# Patient Record
Sex: FEMALE | Race: WHITE | Employment: PART TIME | URBAN - METROPOLITAN AREA
[De-identification: names, ages, dates, MRNs, and addresses within clinical notes are randomized per-mention and may not be internally consistent; named-entity substitution may affect disease eponyms.]

---

## 2021-10-11 ENCOUNTER — HOSPITAL ENCOUNTER (EMERGENCY)
Age: 31
Discharge: LEFT BEFORE DISCHARGE | End: 2021-10-11
Payer: COMMERCIAL

## 2021-10-11 PROCEDURE — 75810000275 HC EMERGENCY DEPT VISIT NO LEVEL OF CARE

## 2023-07-27 ENCOUNTER — APPOINTMENT (OUTPATIENT)
Facility: HOSPITAL | Age: 33
DRG: 861 | End: 2023-07-27
Payer: MEDICAID

## 2023-07-27 ENCOUNTER — HOSPITAL ENCOUNTER (INPATIENT)
Facility: HOSPITAL | Age: 33
LOS: 2 days | Discharge: HOME | DRG: 861 | End: 2023-07-29
Attending: EMERGENCY MEDICINE | Admitting: STUDENT IN AN ORGANIZED HEALTH CARE EDUCATION/TRAINING PROGRAM
Payer: MEDICAID

## 2023-07-27 DIAGNOSIS — R41.82 ALTERED MENTAL STATUS, UNSPECIFIED ALTERED MENTAL STATUS TYPE: Primary | ICD-10-CM

## 2023-07-27 LAB
ALBUMIN SERPL-MCNC: 3.7 G/DL (ref 3.5–5.2)
ALBUMIN/GLOB SERPL: 1.2 (ref 1.1–2.2)
ALP SERPL-CCNC: 127 U/L (ref 35–104)
ALT SERPL-CCNC: 13 U/L (ref 10–35)
AMPHET UR QL SCN: NEGATIVE
ANION GAP BLD CALC-SCNC: 5 (ref 10–20)
ANION GAP SERPL CALC-SCNC: 11 MMOL/L (ref 5–15)
APAP SERPL-MCNC: <5 UG/ML (ref 10–30)
APPEARANCE UR: CLEAR
AST SERPL-CCNC: 30 U/L (ref 10–35)
BACTERIA URNS QL MICRO: NEGATIVE /HPF
BARBITURATES UR QL SCN: NEGATIVE
BASE DEFICIT BLD-SCNC: 0.1 MMOL/L
BASOPHILS # BLD: 0 K/UL (ref 0–1)
BASOPHILS NFR BLD: 0 % (ref 0–1)
BENZODIAZ UR QL: NEGATIVE
BILIRUB SERPL-MCNC: 0.4 MG/DL (ref 0.2–1)
BILIRUB UR QL: NEGATIVE
BUN SERPL-MCNC: 5 MG/DL (ref 6–20)
BUN/CREAT SERPL: 8 (ref 12–20)
CA-I BLD-MCNC: 1.16 MMOL/L (ref 1.12–1.32)
CALCIUM SERPL-MCNC: 9.1 MG/DL (ref 8.6–10)
CANNABINOIDS UR QL SCN: NEGATIVE
CHLORIDE BLD-SCNC: 117 MMOL/L (ref 100–108)
CHLORIDE SERPL-SCNC: 112 MMOL/L (ref 98–107)
CK SERPL-CCNC: 35 U/L (ref 20–180)
CO2 BLD-SCNC: 25 MMOL/L (ref 19–24)
CO2 SERPL-SCNC: 22 MMOL/L (ref 22–29)
COCAINE UR QL SCN: NEGATIVE
COLOR UR: NORMAL
COMMENT:: NORMAL
CREAT SERPL-MCNC: 0.59 MG/DL (ref 0.5–0.9)
CREAT UR-MCNC: 0.7 MG/DL (ref 0.6–1.3)
DIFFERENTIAL METHOD BLD: ABNORMAL
EKG ATRIAL RATE: 100 BPM
EKG DIAGNOSIS: NORMAL
EKG P AXIS: 28 DEGREES
EKG P-R INTERVAL: 150 MS
EKG Q-T INTERVAL: 344 MS
EKG QRS DURATION: 66 MS
EKG QTC CALCULATION (BAZETT): 443 MS
EKG R AXIS: 61 DEGREES
EKG T AXIS: 19 DEGREES
EKG VENTRICULAR RATE: 100 BPM
EOSINOPHIL # BLD: 0.1 K/UL (ref 0–0.4)
EOSINOPHIL NFR BLD: 2 %
EPITH CASTS URNS QL MICRO: NORMAL /LPF
ERYTHROCYTE [DISTWIDTH] IN BLOOD BY AUTOMATED COUNT: 11.6 % (ref 11.5–14.5)
ETHANOL SERPL-MCNC: <10 MG/DL (ref 0–10)
GLOBULIN SER CALC-MCNC: 3.2 G/DL (ref 2–4)
GLUCOSE BLD STRIP.AUTO-MCNC: 110 MG/DL (ref 65–117)
GLUCOSE BLD STRIP.AUTO-MCNC: 96 MG/DL (ref 74–106)
GLUCOSE SERPL-MCNC: 107 MG/DL (ref 65–100)
GLUCOSE UR STRIP.AUTO-MCNC: NEGATIVE MG/DL
HCG UR QL: NEGATIVE
HCO3 BLDA-SCNC: 25 MMOL/L
HCT VFR BLD AUTO: 41.6 % (ref 35–47)
HGB BLD-MCNC: 14.2 G/DL (ref 11.5–16)
HGB UR QL STRIP: NEGATIVE
IMM GRANULOCYTES # BLD AUTO: 0 K/UL (ref 0–0.04)
IMM GRANULOCYTES NFR BLD AUTO: 0 % (ref 0–0.5)
INR PPP: 1 (ref 0.9–1.1)
KETONES UR QL STRIP.AUTO: NEGATIVE MG/DL
LACTATE BLD-SCNC: 0.6 MMOL/L (ref 0.4–2)
LEUKOCYTE ESTERASE UR QL STRIP.AUTO: NEGATIVE
LYMPHOCYTES # BLD: 1.6 K/UL (ref 0.8–3.5)
LYMPHOCYTES NFR BLD: 26 % (ref 12–49)
Lab: NORMAL
MCH RBC QN AUTO: 33.6 PG (ref 26–34)
MCHC RBC AUTO-ENTMCNC: 34.1 G/DL (ref 30–36.5)
MCV RBC AUTO: 98.3 FL (ref 80–99)
METHADONE UR QL: NEGATIVE
MONOCYTES # BLD: 0.4 K/UL (ref 0–1)
MONOCYTES NFR BLD: 6 % (ref 5–13)
NEUTS SEG # BLD: 4 K/UL (ref 1.8–8)
NEUTS SEG NFR BLD: 66 % (ref 32–75)
NITRITE UR QL STRIP.AUTO: NEGATIVE
NRBC # BLD: 0 K/UL (ref 0–0.01)
NRBC BLD-RTO: 0 PER 100 WBC
OPIATES UR QL: NEGATIVE
PCO2 BLDV: 40.5 MMHG (ref 41–51)
PCP UR QL: NEGATIVE
PH BLDV: 7.4 (ref 7.32–7.42)
PH UR STRIP: 6 (ref 5–8)
PLATELET # BLD AUTO: 289 K/UL (ref 150–400)
PMV BLD AUTO: 9.1 FL (ref 8.9–12.9)
PO2 BLDV: 46 MMHG (ref 25–40)
POTASSIUM BLD-SCNC: 4.6 MMOL/L (ref 3.5–5.5)
POTASSIUM SERPL-SCNC: 4.4 MMOL/L (ref 3.5–5.1)
PROT SERPL-MCNC: 6.9 G/DL (ref 6.4–8.3)
PROT UR STRIP-MCNC: NEGATIVE MG/DL
PROTHROMBIN TIME: 13.5 SEC (ref 11.9–14.6)
RBC # BLD AUTO: 4.23 M/UL (ref 3.8–5.2)
RBC #/AREA URNS HPF: NORMAL /HPF
SALICYLATES SERPL-MCNC: <0.3 MG/DL (ref 3–10)
SAO2 % BLD: 81 %
SERVICE CMNT-IMP: NORMAL
SODIUM BLD-SCNC: 147 MMOL/L (ref 136–145)
SODIUM SERPL-SCNC: 145 MMOL/L (ref 136–145)
SP GR UR REFRACTOMETRY: 1.01 (ref 1–1.03)
SPECIMEN HOLD: NORMAL
SPECIMEN SITE: ABNORMAL
UROBILINOGEN UR QL STRIP.AUTO: 0.2 EU/DL (ref 0.2–1)
WBC # BLD AUTO: 6.2 K/UL (ref 3.6–11)
WBC URNS QL MICRO: NORMAL /HPF (ref 0–4)

## 2023-07-27 PROCEDURE — 81001 URINALYSIS AUTO W/SCOPE: CPT

## 2023-07-27 PROCEDURE — 96374 THER/PROPH/DIAG INJ IV PUSH: CPT

## 2023-07-27 PROCEDURE — 71045 X-RAY EXAM CHEST 1 VIEW: CPT

## 2023-07-27 PROCEDURE — 85610 PROTHROMBIN TIME: CPT

## 2023-07-27 PROCEDURE — 80307 DRUG TEST PRSMV CHEM ANLYZR: CPT

## 2023-07-27 PROCEDURE — 80179 DRUG ASSAY SALICYLATE: CPT

## 2023-07-27 PROCEDURE — 6360000004 HC RX CONTRAST MEDICATION: Performed by: EMERGENCY MEDICINE

## 2023-07-27 PROCEDURE — 2580000003 HC RX 258: Performed by: EMERGENCY MEDICINE

## 2023-07-27 PROCEDURE — 70450 CT HEAD/BRAIN W/O DYE: CPT

## 2023-07-27 PROCEDURE — 80053 COMPREHEN METABOLIC PANEL: CPT

## 2023-07-27 PROCEDURE — 70496 CT ANGIOGRAPHY HEAD: CPT

## 2023-07-27 PROCEDURE — 82803 BLOOD GASES ANY COMBINATION: CPT

## 2023-07-27 PROCEDURE — 82550 ASSAY OF CK (CPK): CPT

## 2023-07-27 PROCEDURE — 6360000002 HC RX W HCPCS: Performed by: EMERGENCY MEDICINE

## 2023-07-27 PROCEDURE — 81025 URINE PREGNANCY TEST: CPT

## 2023-07-27 PROCEDURE — 4A03X5D MEASUREMENT OF ARTERIAL FLOW, INTRACRANIAL, EXTERNAL APPROACH: ICD-10-PCS | Performed by: RADIOLOGY

## 2023-07-27 PROCEDURE — 84295 ASSAY OF SERUM SODIUM: CPT

## 2023-07-27 PROCEDURE — 82962 GLUCOSE BLOOD TEST: CPT

## 2023-07-27 PROCEDURE — 99285 EMERGENCY DEPT VISIT HI MDM: CPT

## 2023-07-27 PROCEDURE — 84132 ASSAY OF SERUM POTASSIUM: CPT

## 2023-07-27 PROCEDURE — 0042T CT BRAIN PERFUSION: CPT

## 2023-07-27 PROCEDURE — 80143 DRUG ASSAY ACETAMINOPHEN: CPT

## 2023-07-27 PROCEDURE — 93005 ELECTROCARDIOGRAM TRACING: CPT | Performed by: INTERNAL MEDICINE

## 2023-07-27 PROCEDURE — 85025 COMPLETE CBC W/AUTO DIFF WBC: CPT

## 2023-07-27 PROCEDURE — 82947 ASSAY GLUCOSE BLOOD QUANT: CPT

## 2023-07-27 PROCEDURE — 82077 ASSAY SPEC XCP UR&BREATH IA: CPT

## 2023-07-27 PROCEDURE — 93005 ELECTROCARDIOGRAM TRACING: CPT | Performed by: EMERGENCY MEDICINE

## 2023-07-27 PROCEDURE — 36415 COLL VENOUS BLD VENIPUNCTURE: CPT

## 2023-07-27 PROCEDURE — 1100000000 HC RM PRIVATE

## 2023-07-27 PROCEDURE — 93010 ELECTROCARDIOGRAM REPORT: CPT | Performed by: SPECIALIST

## 2023-07-27 PROCEDURE — 82330 ASSAY OF CALCIUM: CPT

## 2023-07-27 RX ORDER — SODIUM CHLORIDE, SODIUM LACTATE, POTASSIUM CHLORIDE, AND CALCIUM CHLORIDE .6; .31; .03; .02 G/100ML; G/100ML; G/100ML; G/100ML
1000 INJECTION, SOLUTION INTRAVENOUS ONCE
Status: COMPLETED | OUTPATIENT
Start: 2023-07-27 | End: 2023-07-27

## 2023-07-27 RX ORDER — MIDAZOLAM HYDROCHLORIDE 2 MG/2ML
0.5 INJECTION, SOLUTION INTRAMUSCULAR; INTRAVENOUS EVERY 10 MIN PRN
Status: DISCONTINUED | OUTPATIENT
Start: 2023-07-27 | End: 2023-07-28

## 2023-07-27 RX ADMIN — MIDAZOLAM 0.5 MG: 1 INJECTION INTRAMUSCULAR; INTRAVENOUS at 17:28

## 2023-07-27 RX ADMIN — IOPAMIDOL 100 ML: 755 INJECTION, SOLUTION INTRAVENOUS at 17:52

## 2023-07-27 RX ADMIN — MIDAZOLAM 0.5 MG: 1 INJECTION INTRAMUSCULAR; INTRAVENOUS at 17:21

## 2023-07-27 RX ADMIN — SODIUM CHLORIDE, POTASSIUM CHLORIDE, SODIUM LACTATE AND CALCIUM CHLORIDE 1000 ML: 600; 310; 30; 20 INJECTION, SOLUTION INTRAVENOUS at 16:40

## 2023-07-27 ASSESSMENT — PAIN - FUNCTIONAL ASSESSMENT
PAIN_FUNCTIONAL_ASSESSMENT: NONE - DENIES PAIN
PAIN_FUNCTIONAL_ASSESSMENT: NONE - DENIES PAIN

## 2023-07-27 NOTE — ED TRIAGE NOTES
Patient to ED per EMS was at work and feeling \"lethargic\" and went to sit in car with [facility] running. Wasn't improving and called 911. Patient having involuntary movements of torso and upper limbs. States is nauseas but having no vomiting.  Dr. Ashwin Govea notified and Code Stroke called

## 2023-07-28 ENCOUNTER — APPOINTMENT (OUTPATIENT)
Facility: HOSPITAL | Age: 33
DRG: 861 | End: 2023-07-28
Payer: MEDICAID

## 2023-07-28 LAB
ALBUMIN SERPL-MCNC: 2.7 G/DL (ref 3.5–5)
ALBUMIN/GLOB SERPL: 0.8 (ref 1.1–2.2)
ALP SERPL-CCNC: 89 U/L (ref 45–117)
ALT SERPL-CCNC: 16 U/L (ref 12–78)
ANION GAP SERPL CALC-SCNC: 6 MMOL/L (ref 5–15)
AST SERPL-CCNC: 24 U/L (ref 15–37)
BASOPHILS # BLD: 0 K/UL (ref 0–0.1)
BASOPHILS NFR BLD: 0 % (ref 0–1)
BILIRUB SERPL-MCNC: 0.5 MG/DL (ref 0.2–1)
BUN SERPL-MCNC: 4 MG/DL (ref 6–20)
BUN/CREAT SERPL: 9 (ref 12–20)
CALCIUM SERPL-MCNC: 8.6 MG/DL (ref 8.5–10.1)
CHLORIDE SERPL-SCNC: 117 MMOL/L (ref 97–108)
CO2 SERPL-SCNC: 24 MMOL/L (ref 21–32)
CREAT SERPL-MCNC: 0.47 MG/DL (ref 0.55–1.02)
DIFFERENTIAL METHOD BLD: NORMAL
EOSINOPHIL # BLD: 0 K/UL (ref 0–0.4)
EOSINOPHIL NFR BLD: 0 % (ref 0–7)
ERYTHROCYTE [DISTWIDTH] IN BLOOD BY AUTOMATED COUNT: 11.5 % (ref 11.5–14.5)
GLOBULIN SER CALC-MCNC: 3.2 G/DL (ref 2–4)
GLUCOSE SERPL-MCNC: 111 MG/DL (ref 65–100)
HCT VFR BLD AUTO: 37.6 % (ref 35–47)
HGB BLD-MCNC: 12.9 G/DL (ref 11.5–16)
IMM GRANULOCYTES # BLD AUTO: 0 K/UL (ref 0–0.04)
IMM GRANULOCYTES NFR BLD AUTO: 0 % (ref 0–0.5)
LYMPHOCYTES # BLD: 1.6 K/UL (ref 0.8–3.5)
LYMPHOCYTES NFR BLD: 23 % (ref 12–49)
MAGNESIUM SERPL-MCNC: 2.1 MG/DL (ref 1.6–2.4)
MCH RBC QN AUTO: 33.6 PG (ref 26–34)
MCHC RBC AUTO-ENTMCNC: 34.3 G/DL (ref 30–36.5)
MCV RBC AUTO: 97.9 FL (ref 80–99)
MONOCYTES # BLD: 0.4 K/UL (ref 0–1)
MONOCYTES NFR BLD: 5 % (ref 5–13)
NEUTS SEG # BLD: 4.8 K/UL (ref 1.8–8)
NEUTS SEG NFR BLD: 72 % (ref 32–75)
NRBC # BLD: 0 K/UL (ref 0–0.01)
NRBC BLD-RTO: 0 PER 100 WBC
PHOSPHATE SERPL-MCNC: 2.6 MG/DL (ref 2.6–4.7)
PLATELET # BLD AUTO: 235 K/UL (ref 150–400)
PMV BLD AUTO: 9.5 FL (ref 8.9–12.9)
POTASSIUM SERPL-SCNC: 3.5 MMOL/L (ref 3.5–5.1)
PROT SERPL-MCNC: 5.9 G/DL (ref 6.4–8.2)
RBC # BLD AUTO: 3.84 M/UL (ref 3.8–5.2)
SODIUM SERPL-SCNC: 147 MMOL/L (ref 136–145)
TROPONIN I SERPL HS-MCNC: <4 NG/L (ref 0–51)
WBC # BLD AUTO: 6.8 K/UL (ref 3.6–11)

## 2023-07-28 PROCEDURE — 83735 ASSAY OF MAGNESIUM: CPT

## 2023-07-28 PROCEDURE — 80053 COMPREHEN METABOLIC PANEL: CPT

## 2023-07-28 PROCEDURE — 84484 ASSAY OF TROPONIN QUANT: CPT

## 2023-07-28 PROCEDURE — 92610 EVALUATE SWALLOWING FUNCTION: CPT

## 2023-07-28 PROCEDURE — 84100 ASSAY OF PHOSPHORUS: CPT

## 2023-07-28 PROCEDURE — 6370000000 HC RX 637 (ALT 250 FOR IP): Performed by: INTERNAL MEDICINE

## 2023-07-28 PROCEDURE — 97165 OT EVAL LOW COMPLEX 30 MIN: CPT

## 2023-07-28 PROCEDURE — 36415 COLL VENOUS BLD VENIPUNCTURE: CPT

## 2023-07-28 PROCEDURE — 1100000000 HC RM PRIVATE

## 2023-07-28 PROCEDURE — 97161 PT EVAL LOW COMPLEX 20 MIN: CPT

## 2023-07-28 PROCEDURE — 99223 1ST HOSP IP/OBS HIGH 75: CPT | Performed by: NURSE PRACTITIONER

## 2023-07-28 PROCEDURE — 6360000002 HC RX W HCPCS: Performed by: STUDENT IN AN ORGANIZED HEALTH CARE EDUCATION/TRAINING PROGRAM

## 2023-07-28 PROCEDURE — 2580000003 HC RX 258: Performed by: STUDENT IN AN ORGANIZED HEALTH CARE EDUCATION/TRAINING PROGRAM

## 2023-07-28 PROCEDURE — 97530 THERAPEUTIC ACTIVITIES: CPT

## 2023-07-28 PROCEDURE — 97116 GAIT TRAINING THERAPY: CPT

## 2023-07-28 PROCEDURE — 85025 COMPLETE CBC W/AUTO DIFF WBC: CPT

## 2023-07-28 PROCEDURE — 93005 ELECTROCARDIOGRAM TRACING: CPT | Performed by: INTERNAL MEDICINE

## 2023-07-28 RX ORDER — SODIUM CHLORIDE 9 MG/ML
INJECTION, SOLUTION INTRAVENOUS PRN
Status: DISCONTINUED | OUTPATIENT
Start: 2023-07-28 | End: 2023-07-29 | Stop reason: HOSPADM

## 2023-07-28 RX ORDER — POLYETHYLENE GLYCOL 3350 17 G/17G
17 POWDER, FOR SOLUTION ORAL DAILY PRN
Status: DISCONTINUED | OUTPATIENT
Start: 2023-07-28 | End: 2023-07-29 | Stop reason: HOSPADM

## 2023-07-28 RX ORDER — POTASSIUM CHLORIDE 750 MG/1
40 TABLET, FILM COATED, EXTENDED RELEASE ORAL ONCE
Status: COMPLETED | OUTPATIENT
Start: 2023-07-28 | End: 2023-07-28

## 2023-07-28 RX ORDER — POLYETHYLENE GLYCOL 3350 17 G/17G
17 POWDER, FOR SOLUTION ORAL DAILY PRN
Status: DISCONTINUED | OUTPATIENT
Start: 2023-07-28 | End: 2023-07-28

## 2023-07-28 RX ORDER — ACETAMINOPHEN 650 MG/1
650 SUPPOSITORY RECTAL EVERY 6 HOURS PRN
Status: DISCONTINUED | OUTPATIENT
Start: 2023-07-28 | End: 2023-07-29 | Stop reason: HOSPADM

## 2023-07-28 RX ORDER — ENOXAPARIN SODIUM 100 MG/ML
40 INJECTION SUBCUTANEOUS DAILY
Status: DISCONTINUED | OUTPATIENT
Start: 2023-07-28 | End: 2023-07-29 | Stop reason: HOSPADM

## 2023-07-28 RX ORDER — ATORVASTATIN CALCIUM 20 MG/1
80 TABLET, FILM COATED ORAL NIGHTLY
Status: DISCONTINUED | OUTPATIENT
Start: 2023-07-28 | End: 2023-07-28

## 2023-07-28 RX ORDER — SODIUM CHLORIDE 0.9 % (FLUSH) 0.9 %
5-40 SYRINGE (ML) INJECTION PRN
Status: DISCONTINUED | OUTPATIENT
Start: 2023-07-28 | End: 2023-07-29 | Stop reason: HOSPADM

## 2023-07-28 RX ORDER — ASPIRIN 81 MG/1
81 TABLET, CHEWABLE ORAL DAILY
Status: DISCONTINUED | OUTPATIENT
Start: 2023-07-29 | End: 2023-07-28

## 2023-07-28 RX ORDER — ACETAMINOPHEN 325 MG/1
650 TABLET ORAL EVERY 6 HOURS PRN
Status: DISCONTINUED | OUTPATIENT
Start: 2023-07-28 | End: 2023-07-29 | Stop reason: HOSPADM

## 2023-07-28 RX ORDER — ONDANSETRON 4 MG/1
4 TABLET, ORALLY DISINTEGRATING ORAL EVERY 8 HOURS PRN
Status: DISCONTINUED | OUTPATIENT
Start: 2023-07-28 | End: 2023-07-28

## 2023-07-28 RX ORDER — ONDANSETRON 4 MG/1
4 TABLET, ORALLY DISINTEGRATING ORAL EVERY 8 HOURS PRN
Status: DISCONTINUED | OUTPATIENT
Start: 2023-07-28 | End: 2023-07-29 | Stop reason: HOSPADM

## 2023-07-28 RX ORDER — SODIUM CHLORIDE 0.9 % (FLUSH) 0.9 %
5-40 SYRINGE (ML) INJECTION EVERY 12 HOURS SCHEDULED
Status: DISCONTINUED | OUTPATIENT
Start: 2023-07-28 | End: 2023-07-29 | Stop reason: HOSPADM

## 2023-07-28 RX ORDER — ONDANSETRON 2 MG/ML
4 INJECTION INTRAMUSCULAR; INTRAVENOUS EVERY 6 HOURS PRN
Status: DISCONTINUED | OUTPATIENT
Start: 2023-07-28 | End: 2023-07-28

## 2023-07-28 RX ORDER — ASPIRIN 300 MG/1
300 SUPPOSITORY RECTAL DAILY
Status: DISCONTINUED | OUTPATIENT
Start: 2023-07-29 | End: 2023-07-28

## 2023-07-28 RX ORDER — ONDANSETRON 2 MG/ML
4 INJECTION INTRAMUSCULAR; INTRAVENOUS EVERY 6 HOURS PRN
Status: DISCONTINUED | OUTPATIENT
Start: 2023-07-28 | End: 2023-07-29 | Stop reason: HOSPADM

## 2023-07-28 RX ADMIN — SODIUM CHLORIDE, PRESERVATIVE FREE 10 ML: 5 INJECTION INTRAVENOUS at 22:21

## 2023-07-28 RX ADMIN — SODIUM CHLORIDE, PRESERVATIVE FREE 10 ML: 5 INJECTION INTRAVENOUS at 12:04

## 2023-07-28 RX ADMIN — SODIUM CHLORIDE, PRESERVATIVE FREE 10 ML: 5 INJECTION INTRAVENOUS at 22:22

## 2023-07-28 RX ADMIN — POTASSIUM CHLORIDE 40 MEQ: 750 TABLET, FILM COATED, EXTENDED RELEASE ORAL at 19:25

## 2023-07-28 RX ADMIN — ENOXAPARIN SODIUM 40 MG: 100 INJECTION SUBCUTANEOUS at 12:04

## 2023-07-28 NOTE — CARE COORDINATION
7/28/2023  Case Management Initial Evaluation    4:00 PM  Patient lives with her mother in a 2 story home. She is independent with ADLs at baseline, drives, works. She does not use any DME and no history of HH. Her mother Pernell Orellana is her emergency contact and will drive her home at discharge. She confirms she sees a family doctor//PCP but can't remember the name or location. Chart indicates she has seen an in network doctor in the past. She gets prescriptions from [facility]. No anticipated needs for discharge, patient is anxious to return home.      07/28/23 3955   Service Assessment   Patient Orientation Alert and Oriented   Cognition Alert   History Provided By Patient   Primary [address] Parent; Family Members   Patient's [address] is: Legal Next of [address] [place] [address]   PCP Verified by CM Yes  (Patient confirms she has a PCP but cannot remember name or location of PCP or practice)   Last Visit to PCP Within last year   Prior Functional Level Independent in ADLs/IADLs   Current Functional Level Independent in ADLs/IADLs   Can patient return to prior living arrangement Yes   Ability to make needs known: Fair   Family able to assist with home care needs: Yes   Would you like for me to discuss the discharge plan with any other family members/significant others, and if so, who? Yes  (as needed)   Financial Resources Medicaid   Social/Functional History   Lives With Family   Type of [address] [place] [address] Dr Kumar level   [address]   Transfer Assistance Independent   Active  Yes   Mode of Transportation Car   Occupation Full time employment   Discharge Planning   Type of [address] Family Members;Parent   Current Services Prior To Admission None   Potential Assistance Needed N/A   DME Ordered?  No   Potential Assistance Purchasing

## 2023-07-28 NOTE — CARE COORDINATION
7/28/2023  Case Management Note    3:32 PM  Attempted to see patient for initial evaluation, she was just entering the bathroom and asked for me to come back. Will retry.      DESIRAE Nava

## 2023-07-28 NOTE — PROGRESS NOTES
MRI attempted on the open scanner, patient refused to attempt the test. [facility] 7/28/23 6:27PM

## 2023-07-28 NOTE — ED NOTES
Report called to [facility] 5th floor, Sim Goltz, RN. [address] here for patient transport.      Dawn Orta RN  07/28/23 8652

## 2023-07-28 NOTE — PROGRESS NOTES
Hospitalist Progress Note      NAME:  Bernabe Pimentel   :  1990  MRM:  [id number]    Date/Time: 2023  6:49 PM           Assessment / Plan:     27 yo F with hx depression on Lexapro she reports who presents to Ed with AMS and strange movements and is admitted for further evaluation    Involuntary movements of extremities  Stoccato like speech disturbance  Dilated pupils  Hx opiate abuse  - pt on ms tele. Plan to send for mri and eeg.  - Pt REFUSED mri multiple times at this point  - PENDING EEG  - ADMITTED to taking 8 pills of Lexapro in multiple handfuls today, then immediately denied it again all in front of her mother as a witness. - If ssri overdose, I have seen no prolonged qtc or evident seizure on my exam. There are no signs of serotonin syndrome  - patients given history and exam is very disorganized. Qtc when assessed and reflexes are presently beingn. Pupils still dilated. The admission of overdose of ssri is plausible still. Consult psych. Hx opiate abuse - avoidopiates. Uds neg    I have personally reviewed the radiographs, laboratory data in Epic and decisions and statements above are based partially on this personal interpretation.                  Care Plan discussed with: Patient, Family, and >50% of time spent in counseling and coordination of care    Discussed:  Care Plan    Prophylaxis:  SCD's    Disposition:  Home w/Family v inpt psych           ___________________________________________________    Attending Physician: Smita Aguillon DO        Subjective:     Chief Complaint:      Pt heard new that her sister was giving birth  She then was anxious because all her own memories of having to give up her child came flooding back  She ran to car and then doesn't seem to give reliable history from there  Ued to be on lexapro but not any more  Admitted to taking 8 lexapro  Then says she didn't  Very inconsistent historian  Stoccato like speech that improves when angry    ROS:  (bold

## 2023-07-29 VITALS
SYSTOLIC BLOOD PRESSURE: 120 MMHG | TEMPERATURE: 98.7 F | DIASTOLIC BLOOD PRESSURE: 69 MMHG | OXYGEN SATURATION: 99 % | BODY MASS INDEX: 25.76 KG/M2 | HEIGHT: 62 IN | HEART RATE: 78 BPM | RESPIRATION RATE: 18 BRPM | WEIGHT: 140 LBS

## 2023-07-29 LAB
CHOLEST SERPL-MCNC: 133 MG/DL
ERYTHROCYTE [DISTWIDTH] IN BLOOD BY AUTOMATED COUNT: 11.4 % (ref 11.5–14.5)
EST. AVERAGE GLUCOSE BLD GHB EST-MCNC: 97 MG/DL
FOLATE SERPL-MCNC: 19.6 NG/ML (ref 5–21)
HBA1C MFR BLD: 5 % (ref 4–5.6)
HCT VFR BLD AUTO: 37.8 % (ref 35–47)
HDLC SERPL-MCNC: 53 MG/DL
HDLC SERPL: 2.5 (ref 0–5)
HGB BLD-MCNC: 13.1 G/DL (ref 11.5–16)
LDLC SERPL CALC-MCNC: 56.4 MG/DL (ref 0–100)
MCH RBC QN AUTO: 33.5 PG (ref 26–34)
MCHC RBC AUTO-ENTMCNC: 34.7 G/DL (ref 30–36.5)
MCV RBC AUTO: 96.7 FL (ref 80–99)
NRBC # BLD: 0 K/UL (ref 0–0.01)
NRBC BLD-RTO: 0 PER 100 WBC
PLATELET # BLD AUTO: 260 K/UL (ref 150–400)
PMV BLD AUTO: 9.6 FL (ref 8.9–12.9)
RBC # BLD AUTO: 3.91 M/UL (ref 3.8–5.2)
RPR SER QL: NONREACTIVE
TRIGL SERPL-MCNC: 118 MG/DL
TSH SERPL DL<=0.05 MIU/L-ACNC: 0.24 UIU/ML (ref 0.36–3.74)
VIT B12 SERPL-MCNC: 632 PG/ML (ref 193–986)
VLDLC SERPL CALC-MCNC: 23.6 MG/DL
WBC # BLD AUTO: 8.5 K/UL (ref 3.6–11)

## 2023-07-29 PROCEDURE — 82746 ASSAY OF FOLIC ACID SERUM: CPT

## 2023-07-29 PROCEDURE — 86592 SYPHILIS TEST NON-TREP QUAL: CPT

## 2023-07-29 PROCEDURE — 82607 VITAMIN B-12: CPT

## 2023-07-29 PROCEDURE — 36415 COLL VENOUS BLD VENIPUNCTURE: CPT

## 2023-07-29 PROCEDURE — 80061 LIPID PANEL: CPT

## 2023-07-29 PROCEDURE — 84443 ASSAY THYROID STIM HORMONE: CPT

## 2023-07-29 PROCEDURE — 85027 COMPLETE CBC AUTOMATED: CPT

## 2023-07-29 PROCEDURE — 83036 HEMOGLOBIN GLYCOSYLATED A1C: CPT

## 2023-07-29 PROCEDURE — 6360000002 HC RX W HCPCS: Performed by: STUDENT IN AN ORGANIZED HEALTH CARE EDUCATION/TRAINING PROGRAM

## 2023-07-29 RX ADMIN — ENOXAPARIN SODIUM 40 MG: 100 INJECTION SUBCUTANEOUS at 09:36

## 2023-07-29 NOTE — DISCHARGE INSTRUCTIONS
HOSPITALIST DISCHARGE INSTRUCTIONS  NAME:  Guillermina Del Real   :  1990   MRN:  [id number]     Date/Time:  2023 11:03 AM    ADMIT DATE: 2023     DISCHARGE DATE: 2023     DISCHARGE DIAGNOSIS:    Presumed Toxidrome following emotional event    DISCHARGE INSTRUCTIONS:  Thank you for allowing us to participate in your care. Your discharging Hospitalist is Rusty Contreras DO. You were admitted for evaluation and treatment of the above. You have elected to leave the hospital prior to MRI brain getting done or EEG doing read. We advised that you remain for these but you have refused. We ask you to not take any medications to excess and that you call the psychiatrist for follow up and consideration of working on coping mechanisms    MEDICATIONS:    It is important that you take the medication exactly as they are prescribed. Keep your medication in the bottles provided by the pharmacist and keep a list of the medication names, dosages, and times to be taken in your wallet. Do not take other medications without consulting your doctor. If you experience any of the following symptoms then please call your primary care physician or return to the emergency room if you cannot get hold of your doctor:  Fever, chills, nausea, vomiting, diarrhea, change in mentation, falling, bleeding, shortness of breath    Follow Up:  Please call the below provider to arrange hospital follow up appointment      None None    Follow up  1-2 weeks with primary care    [address] [place] [address]  [phone]  Follow up  1-2 weeks      For questions regarding your Hospitalization or to contact the [address] [place] [address] team, please call [phone]. Information obtained by :  I understand that if any problems occur once I am at home I am to contact my physician. I understand and acknowledge receipt of the instructions indicated above.

## 2023-07-29 NOTE — PROGRESS NOTES
Pt declined Brain MRI (See the imaging order section) and the EEG ordered yesterday was not performed. I will sign off as I have no data to review and patient is being discharged.

## 2023-07-29 NOTE — PROGRESS NOTES
Pt discharge home with her mother. DC instructions given. Pt verbalized understanding. No distress noted. IV discontinue. Telemetry was remove by patient earlier.

## 2023-07-29 NOTE — DISCHARGE SUMMARY
Hospitalist Discharge Summary     Patient ID:  Jacquie Corado  [id number]  89 y.o.  1990    Admit date: 7/27/2023    Discharge date and time: 7/29/2023    Admission Diagnoses: Altered mental status [R41.82]  Altered mental status, unspecified altered mental status type [R41.82]    Discharge Diagnoses:    Principal Problem:    Altered mental status  Resolved Problems:    * No resolved hospital problems. *         Hospital Course:   25 yo F with pmhx substance who rpesents to ED with CC abnormal movements of extremtiies at work and dilated pupils. Tox screen neg. Initially pt reported taking too many ssris but then later denied saying it. Tried to get MRI but patient refused. Got EEG, results pending. Pt requested discharge denying further testing. Her symptoms have resolved at this point. Updated mother as she has been present throughout case. Pt denies this was suicide attempt and mother also has no concerns for suicide. Advised pt to follow up to psych to work on coping mechanisms. Do not supsect seizure. Suspect psych related. Nott unreasonable to dc with outpatient f/u to psych. Physical exam no benign and back to normal.    Imaging  CT HEAD WO CONTRAST    Result Date: 7/27/2023  No acute abnormality     XR CHEST PORTABLE    Result Date: 7/27/2023  No acute cardiopulmonary process. CT Brain Perfusion    Result Date: 7/27/2023  1. No evidence for acute large vessel arterial occlusion or significant cerebral perfusion abnormality. CTA HEAD NECK W CONTRAST    Result Date: 7/27/2023  1. No evidence for acute large vessel arterial occlusion or significant cerebral perfusion abnormality.         PCP: None None     Consults: Neuro    Condition of patient at discharge: Improved    Discharge Exam:    Physical Exam:    Gen: Well-developed, well-nourished, in no acute distress  HEENT:  Pink conjunctivae, PERRL, hearing intact to voice, moist mucous membranes  Neck: Supple, without masses, thyroid

## 2023-07-30 LAB
EKG ATRIAL RATE: 104 BPM
EKG ATRIAL RATE: 83 BPM
EKG DIAGNOSIS: NORMAL
EKG DIAGNOSIS: NORMAL
EKG P AXIS: 32 DEGREES
EKG P AXIS: 50 DEGREES
EKG P-R INTERVAL: 152 MS
EKG P-R INTERVAL: 188 MS
EKG Q-T INTERVAL: 362 MS
EKG Q-T INTERVAL: 386 MS
EKG QRS DURATION: 74 MS
EKG QRS DURATION: 76 MS
EKG QTC CALCULATION (BAZETT): 453 MS
EKG QTC CALCULATION (BAZETT): 476 MS
EKG R AXIS: 60 DEGREES
EKG R AXIS: 95 DEGREES
EKG T AXIS: -12 DEGREES
EKG T AXIS: -22 DEGREES
EKG VENTRICULAR RATE: 104 BPM
EKG VENTRICULAR RATE: 83 BPM

## 2023-07-30 PROCEDURE — 93010 ELECTROCARDIOGRAM REPORT: CPT | Performed by: SPECIALIST

## 2023-10-17 ENCOUNTER — APPOINTMENT (OUTPATIENT)
Facility: HOSPITAL | Age: 33
End: 2023-10-17
Payer: COMMERCIAL

## 2023-10-17 ENCOUNTER — HOSPITAL ENCOUNTER (EMERGENCY)
Facility: HOSPITAL | Age: 33
Discharge: HOME | End: 2023-10-17
Attending: FAMILY MEDICINE
Payer: COMMERCIAL

## 2023-10-17 VITALS
SYSTOLIC BLOOD PRESSURE: 117 MMHG | TEMPERATURE: 98 F | BODY MASS INDEX: 27.23 KG/M2 | WEIGHT: 148 LBS | DIASTOLIC BLOOD PRESSURE: 74 MMHG | HEART RATE: 106 BPM | RESPIRATION RATE: 18 BRPM | HEIGHT: 62 IN | OXYGEN SATURATION: 94 %

## 2023-10-17 DIAGNOSIS — S00.83XA TRAUMATIC HEMATOMA OF FOREHEAD, INITIAL ENCOUNTER: ICD-10-CM

## 2023-10-17 DIAGNOSIS — S42.035A CLOSED NONDISPLACED FRACTURE OF ACROMIAL END OF LEFT CLAVICLE, INITIAL ENCOUNTER: Primary | ICD-10-CM

## 2023-10-17 PROCEDURE — 70486 CT MAXILLOFACIAL W/O DYE: CPT

## 2023-10-17 PROCEDURE — 99284 EMERGENCY DEPT VISIT MOD MDM: CPT

## 2023-10-17 PROCEDURE — 71045 X-RAY EXAM CHEST 1 VIEW: CPT

## 2023-10-17 PROCEDURE — 6370000000 HC RX 637 (ALT 250 FOR IP): Performed by: STUDENT IN AN ORGANIZED HEALTH CARE EDUCATION/TRAINING PROGRAM

## 2023-10-17 PROCEDURE — 70450 CT HEAD/BRAIN W/O DYE: CPT

## 2023-10-17 PROCEDURE — 72125 CT NECK SPINE W/O DYE: CPT

## 2023-10-17 PROCEDURE — 73030 X-RAY EXAM OF SHOULDER: CPT

## 2023-10-17 RX ORDER — ACETAMINOPHEN 500 MG
1000 TABLET ORAL
Status: COMPLETED | OUTPATIENT
Start: 2023-10-17 | End: 2023-10-17

## 2023-10-17 RX ADMIN — ACETAMINOPHEN 1000 MG: 500 TABLET ORAL at 19:27

## 2023-10-17 ASSESSMENT — PAIN SCALES - GENERAL: PAINLEVEL_OUTOF10: 3

## 2023-10-17 ASSESSMENT — PAIN - FUNCTIONAL ASSESSMENT: PAIN_FUNCTIONAL_ASSESSMENT: 0-10
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
## 2023-10-18 NOTE — DISCHARGE INSTRUCTIONS
Thank you! Thank you for allowing me to care for you in the emergency department. It is my goal to provide you with excellent care. If you have not received excellent quality care, please ask to speak to the nurse manager. Please fill out the survey that will come to you by mail or email since we listen to your feedback! Below you will find a list of your tests from today's visit. Should you have any questions, please do not hesitate to call the emergency department. Labs  No results found for this or any previous visit (from the past 12 hour(s)). Radiologic Studies  CT HEAD WO CONTRAST   Final Result   No acute intracranial process is identified. Please see above for additional nonemergent incidental findings. There is no acute fracture or dislocation identified. Soft tissue edema overlies the left orbit and left frontal bone. .      CT MAXILLOFACIAL WO CONTRAST   Final Result   No acute intracranial process is identified. Please see above for additional nonemergent incidental findings. There is no acute fracture or dislocation identified. Soft tissue edema overlies the left orbit and left frontal bone. .      CT CERVICAL SPINE WO CONTRAST   Final Result   No acute fracture or subluxation. XR CHEST PORTABLE   Final Result      Nondisplaced fracture of the distal aspect of the left clavicle. Lungs are   clear. XR SHOULDER LEFT (MIN 2 VIEWS)   Final Result   Nondisplaced fracture of the distal left clavicle.        ------------------------------------------------------------------------------------------------------------  The exam and treatment you received in the Emergency Department were for an urgent problem and are not intended as complete care. It is important that you follow-up with a doctor, nurse practitioner, or physician assistant to:  (1) confirm your diagnosis,  (2) re-evaluation of changes in your illness and treatment, and  (3) for ongoing care.  Please take
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
## 2023-11-03 ENCOUNTER — HOSPITAL ENCOUNTER (EMERGENCY)
Facility: HOSPITAL | Age: 33
Discharge: HOME | End: 2023-11-03
Attending: STUDENT IN AN ORGANIZED HEALTH CARE EDUCATION/TRAINING PROGRAM
Payer: COMMERCIAL

## 2023-11-03 VITALS
HEIGHT: 63 IN | OXYGEN SATURATION: 99 % | HEART RATE: 99 BPM | WEIGHT: 137 LBS | DIASTOLIC BLOOD PRESSURE: 81 MMHG | BODY MASS INDEX: 24.27 KG/M2 | RESPIRATION RATE: 17 BRPM | TEMPERATURE: 98.6 F | SYSTOLIC BLOOD PRESSURE: 120 MMHG

## 2023-11-03 DIAGNOSIS — F10.90 ALCOHOL USE DISORDER: Primary | ICD-10-CM

## 2023-11-03 PROCEDURE — 99282 EMERGENCY DEPT VISIT SF MDM: CPT

## 2023-11-03 ASSESSMENT — PAIN SCALES - GENERAL: PAINLEVEL_OUTOF10: 3

## 2023-11-03 ASSESSMENT — LIFESTYLE VARIABLES
HOW MANY STANDARD DRINKS CONTAINING ALCOHOL DO YOU HAVE ON A TYPICAL DAY: 10 OR MORE
HOW OFTEN DO YOU HAVE A DRINK CONTAINING ALCOHOL: 4 OR MORE TIMES A WEEK

## 2023-11-03 ASSESSMENT — PAIN DESCRIPTION - LOCATION: LOCATION: GENERALIZED

## 2023-11-03 NOTE — ED TRIAGE NOTES
Patient was discharged from a mental health facility today due to insurance issues and was unable to stay the full 30 days. Family member with patient states they called crisis line & were told to come to ED. Patient denies SI, but mother said that is not what patient has been saying.

## 2023-11-03 NOTE — ED PROVIDER NOTES
[facility] EMERGENCY DEPT  EMERGENCY DEPARTMENT HISTORY AND PHYSICAL EXAM      Date: 11/3/2023  Patient Name: Wallace Kuo  MRN: [id number]  [address] 1990  Date of evaluation: 11/3/2023  Provider: Donovan Barth MD   Note Started: 7:54 PM EDT 11/3/23    HISTORY OF PRESENT ILLNESS     Chief Complaint   Patient presents with    Mental Health Problem       History Provided By: Patient    HPI: Wallace Kuo is a 26 y.o. female ***    PAST MEDICAL HISTORY   Past Medical History:  Past Medical History:   Diagnosis Date    Depression        Past Surgical History:  History reviewed. No pertinent surgical history. Family History:  Family History   Problem Relation Age of Onset    Hypertension Mother     Cancer Maternal Grandfather        Social History:  Social History     Tobacco Use    Smoking status: Every Day     Packs/day: .5     Types: Cigarettes   Substance Use Topics    Alcohol use: Yes    Drug use: No       Allergies:  No Known Allergies    PCP: None, None    Current Meds:   No current facility-administered medications for this encounter. No current outpatient medications on file.        Social Determinants of Health:   Social Determinants of Health     Tobacco Use: High Risk (11/3/2023)    Patient History     Smoking Tobacco Use: Every Day     Smokeless Tobacco Use: Unknown     Passive Exposure: Not on file   Alcohol Use: Heavy Drinker (11/3/2023)    AUDIT-C     Frequency of Alcohol Consumption: 4 or more times a week     Average Number of Drinks: 10 or more     Frequency of Binge Drinking: Daily or almost daily   Financial Resource Strain: Not on file   Food Insecurity: Not on file   Transportation Needs: Not on file   Physical Activity: Not on file   Stress: Not on file   Social Connections: Not on file   Intimate Partner Violence: Not on file   Depression: Not on file   Housing Stability: Not on file   Interpersonal Safety: Not on file   Utilities: Not on file       PHYSICAL EXAM   Physical
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
## 2023-11-04 NOTE — BSMART NOTE
BSMART assessment completed, and suicide risk level noted to be NO RISK. Primary Nurse Marchelle Olszewski and Physician Avery López notified. Concerns not observed. Security/Off- has not been notified.

## 2023-11-25 ENCOUNTER — HOSPITAL ENCOUNTER (EMERGENCY)
Facility: HOSPITAL | Age: 33
Discharge: HOME | End: 2023-11-25
Attending: EMERGENCY MEDICINE
Payer: COMMERCIAL

## 2023-11-25 VITALS
RESPIRATION RATE: 18 BRPM | DIASTOLIC BLOOD PRESSURE: 96 MMHG | SYSTOLIC BLOOD PRESSURE: 141 MMHG | BODY MASS INDEX: 24.8 KG/M2 | OXYGEN SATURATION: 98 % | WEIGHT: 140 LBS | HEIGHT: 63 IN | TEMPERATURE: 98.6 F | HEART RATE: 113 BPM

## 2023-11-25 DIAGNOSIS — F19.10 SUBSTANCE ABUSE (HCC): Primary | ICD-10-CM

## 2023-11-25 LAB
EKG ATRIAL RATE: 111 BPM
EKG DIAGNOSIS: NORMAL
EKG P AXIS: 43 DEGREES
EKG P-R INTERVAL: 174 MS
EKG Q-T INTERVAL: 316 MS
EKG QRS DURATION: 78 MS
EKG QTC CALCULATION (BAZETT): 429 MS
EKG R AXIS: 75 DEGREES
EKG T AXIS: -7 DEGREES
EKG VENTRICULAR RATE: 111 BPM

## 2023-11-25 PROCEDURE — 93005 ELECTROCARDIOGRAM TRACING: CPT | Performed by: EMERGENCY MEDICINE

## 2023-11-25 PROCEDURE — 99283 EMERGENCY DEPT VISIT LOW MDM: CPT

## 2023-11-25 ASSESSMENT — LIFESTYLE VARIABLES
HOW OFTEN DO YOU HAVE A DRINK CONTAINING ALCOHOL: NEVER
HOW MANY STANDARD DRINKS CONTAINING ALCOHOL DO YOU HAVE ON A TYPICAL DAY: PATIENT DOES NOT DRINK

## 2023-11-25 NOTE — ED NOTES
Pt refuses dc vitals, wants to hurry and get to er mother outside so she doesn't leave her. Pt has full time job, and 15 yr old daughter and again verbalizes that she had no intention of harming herself,she verbalized resources around town and she is aware she can return at any time, toincluude calling 911. Mother not in room at time of departure.      Tian Fajardo RN  11/25/23 7231

## 2023-11-25 NOTE — ED NOTES
Pt is requesting clothes and belongings. Spoke with provider and he is going to discharge, belongings given to patient, pt changed. and awaiting discharge.      Myra Carmen, RN  11/25/23 0235

## 2023-11-25 NOTE — ED NOTES
Pt states she took [address] that she buys over the counter. But states it has same effect as heroin, states she took 20 around 4pm because Stiven Barahona has taken\" that many before and she was angry with her mother because she would not give them to her (there was discussion that they (pt and mother) were going to \"wean\" her off of zaza. Pt is awake, alert and oriented, conversing approp, denies any intent to hurt herself.        Thalia Phipps RN  11/25/23 0645

## 2023-11-25 NOTE — ED PROVIDER NOTES
[facility] EMERGENCY DEPT  EMERGENCY DEPARTMENT HISTORY AND PHYSICAL EXAM      Date: 11/25/2023  Patient Name: Netta Vera  MRN: [id number]  [address] [place] 1990  Date of evaluation: 11/25/2023  Provider: Reyes Almonte MD   Note Started: 4:49 PM EST 11/25/23    HISTORY OF PRESENT ILLNESS     Chief Complaint   Patient presents with    Drug Overdose       History Provided By: Patient    HPI: Netta Vera is a 26 y.o. female with no past medical history significant for heroin abuse presents after taking 20 or \"gas station heroin\" in an attempt to get high. She denies any suicidal or homicidal ideations. She did tell her mom that since she brought to the emergency room she denies any fever, chills, nausea, vomiting, chest pain, shortness of breath, rash, diarrhea, headache, night sweats. PAST MEDICAL HISTORY   Past Medical History:  Past Medical History:   Diagnosis Date    Alcohol use disorder     Depression     Other psychoactive substance abuse, uncomplicated ([address])     tianeptine (Concepcion Viola or \"gas station heroin\")       Past Surgical History:  No past surgical history on file. Family History:  Family History   Problem Relation Age of Onset    Hypertension Mother     Cancer Maternal Grandfather        Social History:  Social History     Tobacco Use    Smoking status: Every Day     Packs/day: .5     Types: Cigarettes   Substance Use Topics    Alcohol use: Yes    Drug use: No       Allergies:  No Known Allergies    PCP: None, None    Current Meds:   No current facility-administered medications for this encounter. No current outpatient medications on file.        Social Determinants of Health:   Social Determinants of Health     Tobacco Use: High Risk (11/3/2023)    Patient History     Smoking Tobacco Use: Every Day     Smokeless Tobacco Use: Unknown     Passive Exposure: Not on file   Alcohol Use: Not At Risk (11/25/2023)    AUDIT-C     Frequency of Alcohol Consumption: Never     Average Number of Drinks:

## 2023-11-25 NOTE — ED TRIAGE NOTES
Pt states took 20 za-za pills ([facility] gas station heroin). Denies SI attempt, states was going through withdraws mother called her rehab counselor and was told to give her Suboxone 8mg and 4mg of Suboxone this morning. States started Suboxone about 2-3 weeks ago. States the za-za pills cause similar affects as heroin.